# Patient Record
Sex: FEMALE | Race: WHITE | NOT HISPANIC OR LATINO | Employment: FULL TIME | URBAN - METROPOLITAN AREA
[De-identification: names, ages, dates, MRNs, and addresses within clinical notes are randomized per-mention and may not be internally consistent; named-entity substitution may affect disease eponyms.]

---

## 2023-03-13 LAB
ERYTHROCYTE DISTRIBUTION WIDTH (RATIO) BY AUTOMATED COUNT: 15.4 % (ref 11.5–14.5)
ERYTHROCYTE MEAN CORPUSCULAR HEMOGLOBIN CONCENTRATION (G/DL) BY AUTOMATED: 31.2 G/DL (ref 32–36)
ERYTHROCYTE MEAN CORPUSCULAR VOLUME (FL) BY AUTOMATED COUNT: 89 FL (ref 80–100)
ERYTHROCYTES (10*6/UL) IN BLOOD BY AUTOMATED COUNT: 3.8 X10E12/L (ref 4–5.2)
FERRITIN, PREGNANCY: 13 UG/L
HEMATOCRIT (%) IN BLOOD BY AUTOMATED COUNT: 34 % (ref 36–46)
HEMOGLOBIN (G/DL) IN BLOOD: 10.6 G/DL (ref 12–16)
IRON (UG/DL) IN SER/PLAS IN PREGNANCY: 50 UG/DL
IRON BINDING CAPACITY (UG/DL) IN PREGNANCY: >500 UG/DL
IRON SATURATION (%) IN PREGNANCY: ABNORMAL %
LEUKOCYTES (10*3/UL) IN BLOOD BY AUTOMATED COUNT: 8.6 X10E9/L (ref 4.4–11.3)
PLATELETS (10*3/UL) IN BLOOD AUTOMATED COUNT: 344 X10E9/L (ref 150–450)
REFLEX ADDED, ANEMIA PANEL: ABNORMAL

## 2023-03-14 LAB
FOLATE, SERUM, PREGNANCY: 7.2 NG/ML
VITAMIN B12, PREGNANCY: 237 PG/ML

## 2023-04-28 LAB
ERYTHROCYTE DISTRIBUTION WIDTH (RATIO) BY AUTOMATED COUNT: 15.5 % (ref 11.5–14.5)
ERYTHROCYTE MEAN CORPUSCULAR HEMOGLOBIN CONCENTRATION (G/DL) BY AUTOMATED: 30.1 G/DL (ref 32–36)
ERYTHROCYTE MEAN CORPUSCULAR VOLUME (FL) BY AUTOMATED COUNT: 88 FL (ref 80–100)
ERYTHROCYTES (10*6/UL) IN BLOOD BY AUTOMATED COUNT: 3.83 X10E12/L (ref 4–5.2)
HEMATOCRIT (%) IN BLOOD BY AUTOMATED COUNT: 33.6 % (ref 36–46)
HEMOGLOBIN (G/DL) IN BLOOD: 10.1 G/DL (ref 12–16)
LEUKOCYTES (10*3/UL) IN BLOOD BY AUTOMATED COUNT: 9.3 X10E9/L (ref 4.4–11.3)
PLATELETS (10*3/UL) IN BLOOD AUTOMATED COUNT: 373 X10E9/L (ref 150–450)
REFLEX ADDED, ANEMIA PANEL: ABNORMAL

## 2023-04-29 LAB
FERRITIN, PREGNANCY: 15 UG/L
FOLATE, SERUM, PREGNANCY: 6.7 NG/ML
IRON (UG/DL) IN SER/PLAS IN PREGNANCY: 51 UG/DL
IRON BINDING CAPACITY (UG/DL) IN PREGNANCY: >501 UG/DL
IRON SATURATION (%) IN PREGNANCY: ABNORMAL %
VITAMIN B12, PREGNANCY: 237 PG/ML

## 2023-05-01 LAB
CREATININE (MG/DL) IN URINE: 216 MG/DL (ref 20–320)
PROTEIN (MG/DL) IN URINE: 36 MG/DL (ref 5–24)
PROTEIN/CREATININE (MG/MG) IN URINE: 0.17 MG/MG CREAT (ref 0–0.17)

## 2023-05-04 LAB — GROUP B STREP SCREEN: NORMAL

## 2023-05-15 LAB
ALANINE AMINOTRANSFERASE (SGPT) (U/L) IN SER/PLAS: 10 U/L (ref 7–45)
ALBUMIN (G/DL) IN SER/PLAS: 3 G/DL (ref 3.4–5)
ALKALINE PHOSPHATASE (U/L) IN SER/PLAS: 161 U/L (ref 33–110)
ANION GAP IN SER/PLAS: 10 MMOL/L (ref 10–20)
ASPARTATE AMINOTRANSFERASE (SGOT) (U/L) IN SER/PLAS: 9 U/L (ref 9–39)
BILIRUBIN TOTAL (MG/DL) IN SER/PLAS: 0.7 MG/DL (ref 0–1.2)
CALCIUM (MG/DL) IN SER/PLAS: 8.5 MG/DL (ref 8.6–10.3)
CARBON DIOXIDE, TOTAL (MMOL/L) IN SER/PLAS: 23 MMOL/L (ref 21–32)
CHLORIDE (MMOL/L) IN SER/PLAS: 107 MMOL/L (ref 98–107)
CREATININE (MG/DL) IN SER/PLAS: 0.63 MG/DL (ref 0.5–1.05)
CREATININE (MG/DL) IN URINE: 161 MG/DL (ref 20–320)
ERYTHROCYTE DISTRIBUTION WIDTH (RATIO) BY AUTOMATED COUNT: 16 % (ref 11.5–14.5)
ERYTHROCYTE MEAN CORPUSCULAR HEMOGLOBIN CONCENTRATION (G/DL) BY AUTOMATED: 30.9 G/DL (ref 32–36)
ERYTHROCYTE MEAN CORPUSCULAR VOLUME (FL) BY AUTOMATED COUNT: 87 FL (ref 80–100)
ERYTHROCYTES (10*6/UL) IN BLOOD BY AUTOMATED COUNT: 4.02 X10E12/L (ref 4–5.2)
FERRITIN, PREGNANCY: 16 UG/L
FOLATE, SERUM, PREGNANCY: 10.5 NG/ML
GFR FEMALE: >90 ML/MIN/1.73M2
GLUCOSE (MG/DL) IN SER/PLAS: 80 MG/DL (ref 74–99)
HEMATOCRIT (%) IN BLOOD BY AUTOMATED COUNT: 35 % (ref 36–46)
HEMOGLOBIN (G/DL) IN BLOOD: 10.8 G/DL (ref 12–16)
IRON (UG/DL) IN SER/PLAS IN PREGNANCY: 63 UG/DL
IRON BINDING CAPACITY (UG/DL) IN PREGNANCY: >513 UG/DL
IRON SATURATION (%) IN PREGNANCY: ABNORMAL %
LEUKOCYTES (10*3/UL) IN BLOOD BY AUTOMATED COUNT: 8.6 X10E9/L (ref 4.4–11.3)
PLATELETS (10*3/UL) IN BLOOD AUTOMATED COUNT: 372 X10E9/L (ref 150–450)
POTASSIUM (MMOL/L) IN SER/PLAS: 4.1 MMOL/L (ref 3.5–5.3)
PROTEIN (MG/DL) IN URINE: 27 MG/DL (ref 5–24)
PROTEIN TOTAL: 6.8 G/DL (ref 6.4–8.2)
PROTEIN/CREATININE (MG/MG) IN URINE: 0.17 MG/MG CREAT (ref 0–0.17)
REFLEX ADDED, ANEMIA PANEL: ABNORMAL
SODIUM (MMOL/L) IN SER/PLAS: 136 MMOL/L (ref 136–145)
UREA NITROGEN (MG/DL) IN SER/PLAS: 10 MG/DL (ref 6–23)
VITAMIN B12, PREGNANCY: 209 PG/ML

## 2023-10-28 ENCOUNTER — HOSPITAL ENCOUNTER (EMERGENCY)
Facility: HOSPITAL | Age: 28
Discharge: HOME | End: 2023-10-28
Payer: COMMERCIAL

## 2023-10-28 VITALS
BODY MASS INDEX: 50.02 KG/M2 | RESPIRATION RATE: 18 BRPM | OXYGEN SATURATION: 98 % | HEIGHT: 64 IN | WEIGHT: 293 LBS | HEART RATE: 98 BPM | TEMPERATURE: 99 F | SYSTOLIC BLOOD PRESSURE: 140 MMHG | DIASTOLIC BLOOD PRESSURE: 88 MMHG

## 2023-10-28 DIAGNOSIS — J02.0 STREP PHARYNGITIS: Primary | ICD-10-CM

## 2023-10-28 PROBLEM — K21.9 CHRONIC GERD: Status: ACTIVE | Noted: 2023-10-28

## 2023-10-28 PROBLEM — R76.8 POSITIVE HEPATITIS C ANTIBODY TEST: Status: ACTIVE | Noted: 2023-10-28

## 2023-10-28 LAB
S PYO DNA THROAT QL NAA+PROBE: DETECTED
SARS-COV-2 RNA RESP QL NAA+PROBE: NOT DETECTED

## 2023-10-28 PROCEDURE — 87635 SARS-COV-2 COVID-19 AMP PRB: CPT | Performed by: PHYSICIAN ASSISTANT

## 2023-10-28 PROCEDURE — 99284 EMERGENCY DEPT VISIT MOD MDM: CPT

## 2023-10-28 PROCEDURE — 2500000001 HC RX 250 WO HCPCS SELF ADMINISTERED DRUGS (ALT 637 FOR MEDICARE OP): Performed by: PHYSICIAN ASSISTANT

## 2023-10-28 PROCEDURE — 87651 STREP A DNA AMP PROBE: CPT | Performed by: PHYSICIAN ASSISTANT

## 2023-10-28 PROCEDURE — 2500000004 HC RX 250 GENERAL PHARMACY W/ HCPCS (ALT 636 FOR OP/ED): Mod: JZ | Performed by: PHYSICIAN ASSISTANT

## 2023-10-28 RX ORDER — PREDNISONE 20 MG/1
40 TABLET ORAL ONCE
Status: COMPLETED | OUTPATIENT
Start: 2023-10-28 | End: 2023-10-28

## 2023-10-28 RX ORDER — ACETAMINOPHEN 160 MG/5ML
650 SOLUTION ORAL ONCE
Status: COMPLETED | OUTPATIENT
Start: 2023-10-28 | End: 2023-10-28

## 2023-10-28 RX ADMIN — PENICILLIN G BENZATHINE 1.2 MILLION UNITS: 1200000 INJECTION, SUSPENSION INTRAMUSCULAR at 23:42

## 2023-10-28 RX ADMIN — PREDNISONE 40 MG: 20 TABLET ORAL at 21:00

## 2023-10-28 RX ADMIN — ACETAMINOPHEN 650 MG: 325 SUSPENSION ORAL at 23:18

## 2023-10-28 ASSESSMENT — PAIN - FUNCTIONAL ASSESSMENT: PAIN_FUNCTIONAL_ASSESSMENT: 0-10

## 2023-10-28 ASSESSMENT — PAIN SCALES - GENERAL: PAINLEVEL_OUTOF10: 2

## 2023-10-28 ASSESSMENT — PAIN DESCRIPTION - FREQUENCY: FREQUENCY: CONSTANT/CONTINUOUS

## 2023-10-28 ASSESSMENT — PAIN DESCRIPTION - LOCATION: LOCATION: THROAT

## 2023-10-28 ASSESSMENT — PAIN DESCRIPTION - DESCRIPTORS: DESCRIPTORS: SORE

## 2023-10-28 NOTE — Clinical Note
Clementine Jay was seen and treated in our emergency department on 10/28/2023.  She may return to work on 10/30/2023.       If you have any questions or concerns, please don't hesitate to call.      Will Juarez PA-C

## 2023-10-29 NOTE — ED PROVIDER NOTES
HPI   Chief Complaint   Patient presents with    Sore Throat       History of present illness: 28-year-old female complains of sore throat since yesterday.  Says the pain is moderate, aching, persistent, nonradiating.  Patient has the ability to swallow although it is painful.  Patient has associated fevers chills congestion and cough.  Patient states that tonsils swelling is causing her to gag and she has had a few episodes of emesis.  Patient has no current nausea at this time.  Denies any denies abdominal pain, diarrhea, constipation, dysuria, polyuria, headache, neck stiffness.  She has taken over-the-counter medicine with limited improvement of symptoms.  Patient states she has a history of enlarged tonsils.    Review of systems: Constitutional, eye, ENT, cardiovascular, respiratory, gastrointestinal, genitourinary, neurologic, musculoskeletal, dermatologic, hematologic, endocrine systems were evaluated and were negative unless otherwise specified in history of present illness.    Medications: Reviewed and per nursing note.    Family history: Denies relevant medical conditions.    Past medical history: None per patient    Social history: Denies tobacco, alcohol, drug use.      Physical exam:    Appearance: Well-developed, well-nourished, nontoxic-appearing, alert and oriented x3. Talking in complete sentences.    HEENT: 3+ tonsillar edema with erythema and exudates.  Head normocephalic atraumatic, extraocular movements intact, pupils equal round reactive to light, mucous membranes are moist and pink, normal facial symmetry. Uvula is midline with no stridor, drooling, trismus. No induration the floor of the mouth.  Normal tympanic membranes.    NECK:  Nml Inspection, no meningismus, no thyromegaly, no lymphadenopathy, trachea is midline, no JVD.    Respiratory: Clear to auscultation bilaterally with normal bilateral excursion. No wheezes, rhonchi, rales.    Cardiovascular: Regular rate and rhythm, no murmurs  rubs or gallops. Pulses 2+ symmetrically in the dorsalis pedis and radial pulses.    Abdomen/GI:  Soft, nontender.    :  No CVA tenderness    Neuro:  Oriented x 3, Speech Clear, cranial nerves grossly intact.    Musculoskeletal: Patient spontaneously moves all 4 extremities.    Skin:  No rashes.                          Michaela Coma Scale Score: 15                  Patient History   Past Medical History:   Diagnosis Date    Nausea with vomiting, unspecified 12/13/2019    Nausea and vomiting in adult     Past Surgical History:   Procedure Laterality Date    OTHER SURGICAL HISTORY  11/04/2019    Wart removal     No family history on file.  Social History     Tobacco Use    Smoking status: Not on file    Smokeless tobacco: Not on file   Substance Use Topics    Alcohol use: Not on file    Drug use: Not on file       Physical Exam   ED Triage Vitals   Temp Heart Rate Resp BP   10/28/23 1833 10/28/23 1833 10/28/23 1833 10/28/23 1835   36.9 °C (98.4 °F) (!) 112 18 152/90      SpO2 Temp Source Heart Rate Source Patient Position   -- 10/28/23 1833 10/28/23 1833 10/28/23 1833    Temporal Monitor Sitting      BP Location FiO2 (%)     10/28/23 1833 --     Left arm        Physical Exam    ED Course & MDM   Diagnoses as of 10/28/23 2302   Strep pharyngitis       Medical Decision Making  Labs Reviewed  GROUP A STREPTOCOCCUS, PCR - Abnormal     Group A Strep PCR             Detected (*)            SARS-COV-2 PCR, SYMPTOMATIC - Normal      Patient complains of sore throat.  Differential diagnosis of viral pharyngitis, strep pharyngitis, peritonsillar abscess, mononucleosis.  Examination shows tonsillar edema erythema exudates.  Uvula is midline with no stridor drooling or trismus.  No evidence of abscess formation.  Currently afebrile and nontoxic-appearing.    Rapid strep test, COVID-19 test ordered.  Given prednisone for swollen tonsils.  COVID-19 negative and rapid strep positive.  Patient told results.  Given options of  treatment.  Patient given Bicillin LA 1.2 minutes and IM and Tylenol liquid.    Patient plans on taking Tylenol at home as needed for symptom control.  Will need to follow-up with primary provider.    Patient will be discharged to home.  Patient is educated in signs and symptoms of worsening symptoms and reasons to come back to the emergency department.  Will need to follow up with primary care provider.  Patient does not report social determinants of health impacting ability to obtain care that is needed.  Patient agrees with plan.    This is a transcription.  Text was reviewed for errors, but some transcription errors may remain.  Please call for any questions.              Procedure  Procedures     Will Juarez PA-C  10/28/23 6338

## 2024-01-28 PROBLEM — Z01.419 WELL WOMAN EXAM: Status: ACTIVE | Noted: 2024-01-28

## 2024-01-29 ENCOUNTER — OFFICE VISIT (OUTPATIENT)
Dept: OBSTETRICS AND GYNECOLOGY | Facility: CLINIC | Age: 29
End: 2024-01-29
Payer: COMMERCIAL

## 2024-01-29 VITALS
BODY MASS INDEX: 50.02 KG/M2 | SYSTOLIC BLOOD PRESSURE: 112 MMHG | HEIGHT: 64 IN | DIASTOLIC BLOOD PRESSURE: 72 MMHG | WEIGHT: 293 LBS

## 2024-01-29 DIAGNOSIS — N93.9 ABNORMAL UTERINE BLEEDING (AUB): Primary | ICD-10-CM

## 2024-01-29 PROCEDURE — 99213 OFFICE O/P EST LOW 20 MIN: CPT | Performed by: OBSTETRICS & GYNECOLOGY

## 2024-01-29 PROCEDURE — 1036F TOBACCO NON-USER: CPT | Performed by: OBSTETRICS & GYNECOLOGY

## 2024-01-29 RX ORDER — OMEPRAZOLE 20 MG/1
1 CAPSULE, DELAYED RELEASE ORAL EVERY 24 HOURS
COMMUNITY

## 2024-01-29 RX ORDER — ETONOGESTREL 68 MG/1
IMPLANT SUBCUTANEOUS
COMMUNITY
Start: 2023-06-08

## 2024-01-29 NOTE — PROGRESS NOTES
Subjective   Patient ID: Clementine Jay is a 28 y.o. female who presents for Vaginal Bleeding (Last period was 3 weeks long.).  Vaginal Bleeding      28-year-old with the complaint of abnormal bleeding for 3 weeks.  Patient stopped bleeding 1.5 weeks ago.  Previously had been doing well on the Nexplanon.        Objective   Physical Exam  Constitutional:       Appearance: Normal appearance.   Neurological:      Mental Status: She is alert.   Psychiatric:         Mood and Affect: Mood normal.         Behavior: Behavior normal.         Assessment/Plan   Problem List Items Addressed This Visit             ICD-10-CM    Abnormal uterine bleeding (AUB) - Primary N93.9     --AUB: Patient with the Nexplanon inserted June 2023.  Patient had monthly menses lasting 5 to 7 days on the Nexplanon and tolerating well until December when she bled for 3 weeks, with heavy bleeding for 1.5 weeks.  Bleeding stopped 1.5 weeks ago.  Discussed with patient possible AUB on the Nexplanon.  Her UCG is negative.  Discussed option of observation, addition of hormonal control, or removal of the Nexplanon.  This was her first episode of AUB on the Nexplanon, will observe for now.  If recurs she will notify me.  --CONTRACEPTION:  patient with the Nexplanon inserted June 2023.  Patient with AUB as discussed above.  Will observe for now.  --normal PAP in 2021 and June 2023  --Patient in P1

## 2024-01-29 NOTE — ASSESSMENT & PLAN NOTE
--AUB: Patient with the Nexplanon inserted June 2023.  Patient had monthly menses lasting 5 to 7 days on the Nexplanon and tolerating well until December when she bled for 3 weeks, with heavy bleeding for 1.5 weeks.  Bleeding stopped 1.5 weeks ago.  Discussed with patient possible AUB on the Nexplanon.  Her UCG is negative.  Discussed option of observation, addition of hormonal control, or removal of the Nexplanon.  This was her first episode of AUB on the Nexplanon, will observe for now.  If recurs she will notify me.  --CONTRACEPTION:  patient with the Nexplanon inserted June 2023.  Patient with AUB as discussed above.  Will observe for now.  --normal PAP in 2021 and June 2023  --Patient in P1

## 2024-05-30 ENCOUNTER — TELEPHONE (OUTPATIENT)
Dept: PRIMARY CARE | Facility: CLINIC | Age: 29
End: 2024-05-30
Payer: COMMERCIAL

## 2024-05-30 DIAGNOSIS — J01.90 ACUTE SINUSITIS, RECURRENCE NOT SPECIFIED, UNSPECIFIED LOCATION: Primary | ICD-10-CM

## 2024-05-30 RX ORDER — AMOXICILLIN AND CLAVULANATE POTASSIUM 875; 125 MG/1; MG/1
875 TABLET, FILM COATED ORAL 2 TIMES DAILY
Qty: 20 TABLET | Refills: 0 | Status: SHIPPED | OUTPATIENT
Start: 2024-05-30 | End: 2024-06-09

## 2024-05-30 NOTE — TELEPHONE ENCOUNTER
How long have you been sick? Since Sunday  Cough (productive or nonproductive)? Yes  Color of phlegm? Yellow   Sinus pain?Yes  Sinus drainage/color? Yellow  Earache? Yes  Congestion? Yes  Headache? Yes  Fever (if yes, temp)? Yes   Sore throat? Yes   Short of breath/wheezing? Slight wheezing   OTC medication? Cough syrup and Tylenol    Patient has also had her vision go blurry due to coughing  Patient tested negative for Covid.     LOV: N/A  NOV: N/A

## 2024-06-14 ENCOUNTER — OFFICE VISIT (OUTPATIENT)
Dept: PRIMARY CARE | Facility: CLINIC | Age: 29
End: 2024-06-14
Payer: COMMERCIAL

## 2024-06-14 VITALS
HEART RATE: 74 BPM | BODY MASS INDEX: 50.02 KG/M2 | WEIGHT: 293 LBS | SYSTOLIC BLOOD PRESSURE: 136 MMHG | RESPIRATION RATE: 18 BRPM | TEMPERATURE: 96.8 F | HEIGHT: 64 IN | DIASTOLIC BLOOD PRESSURE: 84 MMHG | OXYGEN SATURATION: 99 %

## 2024-06-14 DIAGNOSIS — R05.1 ACUTE COUGH: ICD-10-CM

## 2024-06-14 DIAGNOSIS — Z00.00 PHYSICAL EXAM, ANNUAL: Primary | ICD-10-CM

## 2024-06-14 PROBLEM — O36.8190 DECREASED FETAL MOVEMENT (HHS-HCC): Status: ACTIVE | Noted: 2023-05-05

## 2024-06-14 PROBLEM — M25.561 RIGHT KNEE PAIN: Status: ACTIVE | Noted: 2024-06-14

## 2024-06-14 PROBLEM — M00.9 INFECTION OF RIGHT KNEE (MULTI): Status: ACTIVE | Noted: 2024-06-14

## 2024-06-14 PROBLEM — L30.9 DERMATITIS: Status: ACTIVE | Noted: 2024-06-14

## 2024-06-14 PROBLEM — R06.83 SNORING: Status: ACTIVE | Noted: 2024-06-14

## 2024-06-14 PROBLEM — A04.8 POSITIVE H. PYLORI TEST: Status: ACTIVE | Noted: 2024-06-14

## 2024-06-14 PROBLEM — O16.9 ELEVATED BLOOD PRESSURE AFFECTING PREGNANCY, ANTEPARTUM (HHS-HCC): Status: ACTIVE | Noted: 2024-06-14

## 2024-06-14 PROBLEM — E86.0 DEHYDRATION: Status: ACTIVE | Noted: 2024-06-14

## 2024-06-14 PROBLEM — R03.0 ELEVATED BLOOD PRESSURE READING: Status: ACTIVE | Noted: 2023-04-02

## 2024-06-14 PROBLEM — B34.9 ACUTE VIRAL SYNDROME: Status: ACTIVE | Noted: 2024-06-14

## 2024-06-14 PROBLEM — H66.91 ACUTE OTITIS MEDIA, RIGHT: Status: ACTIVE | Noted: 2024-06-14

## 2024-06-14 PROBLEM — N92.1 BREAKTHROUGH BLEEDING ON NEXPLANON: Status: ACTIVE | Noted: 2024-06-14

## 2024-06-14 PROBLEM — O99.810 ABNORMAL GLUCOSE AFFECTING PREGNANCY (HHS-HCC): Status: ACTIVE | Noted: 2024-06-14

## 2024-06-14 PROBLEM — O99.210 OBESITY DURING PREGNANCY (HHS-HCC): Status: ACTIVE | Noted: 2024-06-14

## 2024-06-14 PROBLEM — G93.32 CHRONIC FATIGUE DISORDER: Status: ACTIVE | Noted: 2024-06-14

## 2024-06-14 PROBLEM — J01.00 ACUTE MAXILLARY SINUSITIS: Status: ACTIVE | Noted: 2024-06-14

## 2024-06-14 PROBLEM — O99.019 ANEMIA OF PREGNANCY (HHS-HCC): Status: ACTIVE | Noted: 2024-06-14

## 2024-06-14 PROBLEM — R51.9 HEADACHE: Status: ACTIVE | Noted: 2024-06-14

## 2024-06-14 PROBLEM — B07.0 PLANTAR WART OF RIGHT FOOT: Status: ACTIVE | Noted: 2024-06-14

## 2024-06-14 PROBLEM — M25.571 RIGHT ANKLE PAIN: Status: ACTIVE | Noted: 2024-06-14

## 2024-06-14 PROBLEM — L03.119 CELLULITIS OF KNEE: Status: ACTIVE | Noted: 2024-06-14

## 2024-06-14 PROBLEM — Z97.5 BREAKTHROUGH BLEEDING ON NEXPLANON: Status: ACTIVE | Noted: 2024-06-14

## 2024-06-14 PROBLEM — R79.83 HYPERHOMOCYSTINEMIA: Status: ACTIVE | Noted: 2024-06-14

## 2024-06-14 PROBLEM — O99.213 OBESITY AFFECTING PREGNANCY IN THIRD TRIMESTER (HHS-HCC): Status: ACTIVE | Noted: 2024-06-14

## 2024-06-14 PROBLEM — R00.2 PALPITATIONS: Status: ACTIVE | Noted: 2024-06-14

## 2024-06-14 PROBLEM — R06.02 SOB (SHORTNESS OF BREATH): Status: ACTIVE | Noted: 2024-06-14

## 2024-06-14 PROBLEM — O21.9 NAUSEA AND VOMITING DURING PREGNANCY (HHS-HCC): Status: ACTIVE | Noted: 2024-06-14

## 2024-06-14 PROBLEM — B27.90: Status: ACTIVE | Noted: 2024-06-14

## 2024-06-14 PROBLEM — J84.89 INTERSTITIAL PNEUMONITIS (MULTI): Status: ACTIVE | Noted: 2024-06-14

## 2024-06-14 PROBLEM — B27.10: Status: ACTIVE | Noted: 2024-06-14

## 2024-06-14 PROBLEM — L03.90 RECURRENT CELLULITIS: Status: ACTIVE | Noted: 2024-06-14

## 2024-06-14 PROBLEM — N89.8 VAGINAL DISCHARGE: Status: ACTIVE | Noted: 2024-06-14

## 2024-06-14 PROBLEM — E53.8 FOLATE DEFICIENCY: Status: ACTIVE | Noted: 2024-06-14

## 2024-06-14 PROBLEM — J35.1 ENLARGED TONSILS: Status: ACTIVE | Noted: 2024-06-14

## 2024-06-14 PROBLEM — N90.89 LABIAL LESION: Status: ACTIVE | Noted: 2024-06-14

## 2024-06-14 PROBLEM — N39.0 UTI (URINARY TRACT INFECTION): Status: ACTIVE | Noted: 2024-06-14

## 2024-06-14 PROBLEM — M54.50 LOW BACK PAIN: Status: ACTIVE | Noted: 2023-04-01

## 2024-06-14 PROBLEM — N92.6 IRREGULAR MENSES: Status: ACTIVE | Noted: 2024-06-14

## 2024-06-14 PROBLEM — R35.0 URINARY FREQUENCY: Status: ACTIVE | Noted: 2024-06-14

## 2024-06-14 PROBLEM — R06.2 WHEEZING ON AUSCULTATION: Status: ACTIVE | Noted: 2024-06-14

## 2024-06-14 PROCEDURE — 99213 OFFICE O/P EST LOW 20 MIN: CPT

## 2024-06-14 PROCEDURE — 1036F TOBACCO NON-USER: CPT

## 2024-06-14 SDOH — ECONOMIC STABILITY: FOOD INSECURITY: WITHIN THE PAST 12 MONTHS, YOU WORRIED THAT YOUR FOOD WOULD RUN OUT BEFORE YOU GOT MONEY TO BUY MORE.: NEVER TRUE

## 2024-06-14 SDOH — ECONOMIC STABILITY: FOOD INSECURITY: WITHIN THE PAST 12 MONTHS, THE FOOD YOU BOUGHT JUST DIDN'T LAST AND YOU DIDN'T HAVE MONEY TO GET MORE.: NEVER TRUE

## 2024-06-14 ASSESSMENT — ANXIETY QUESTIONNAIRES
1. FEELING NERVOUS, ANXIOUS, OR ON EDGE: NOT AT ALL
3. WORRYING TOO MUCH ABOUT DIFFERENT THINGS: NOT AT ALL
6. BECOMING EASILY ANNOYED OR IRRITABLE: NOT AT ALL
GAD7 TOTAL SCORE: 0
7. FEELING AFRAID AS IF SOMETHING AWFUL MIGHT HAPPEN: NOT AT ALL
2. NOT BEING ABLE TO STOP OR CONTROL WORRYING: NOT AT ALL
4. TROUBLE RELAXING: NOT AT ALL
5. BEING SO RESTLESS THAT IT IS HARD TO SIT STILL: NOT AT ALL
IF YOU CHECKED OFF ANY PROBLEMS ON THIS QUESTIONNAIRE, HOW DIFFICULT HAVE THESE PROBLEMS MADE IT FOR YOU TO DO YOUR WORK, TAKE CARE OF THINGS AT HOME, OR GET ALONG WITH OTHER PEOPLE: NOT DIFFICULT AT ALL

## 2024-06-14 ASSESSMENT — ENCOUNTER SYMPTOMS
NEUROLOGICAL NEGATIVE: 1
ALLERGIC/IMMUNOLOGIC NEGATIVE: 1
GASTROINTESTINAL NEGATIVE: 1
CONSTITUTIONAL NEGATIVE: 1
ENDOCRINE NEGATIVE: 1
DEPRESSION: 0
EYES NEGATIVE: 1
COUGH: 1
PSYCHIATRIC NEGATIVE: 1
HEMATOLOGIC/LYMPHATIC NEGATIVE: 1
OCCASIONAL FEELINGS OF UNSTEADINESS: 0
LOSS OF SENSATION IN FEET: 0
MUSCULOSKELETAL NEGATIVE: 1
CARDIOVASCULAR NEGATIVE: 1

## 2024-06-14 ASSESSMENT — LIFESTYLE VARIABLES
SKIP TO QUESTIONS 9-10: 1
HOW OFTEN DO YOU HAVE SIX OR MORE DRINKS ON ONE OCCASION: NEVER
AUDIT-C TOTAL SCORE: 0
HOW MANY STANDARD DRINKS CONTAINING ALCOHOL DO YOU HAVE ON A TYPICAL DAY: PATIENT DOES NOT DRINK
HOW OFTEN DO YOU HAVE A DRINK CONTAINING ALCOHOL: NEVER

## 2024-06-14 ASSESSMENT — PAIN SCALES - GENERAL: PAINLEVEL: 0-NO PAIN

## 2024-06-14 ASSESSMENT — PATIENT HEALTH QUESTIONNAIRE - PHQ9
SUM OF ALL RESPONSES TO PHQ9 QUESTIONS 1 & 2: 0
2. FEELING DOWN, DEPRESSED OR HOPELESS: NOT AT ALL
1. LITTLE INTEREST OR PLEASURE IN DOING THINGS: NOT AT ALL

## 2024-06-14 NOTE — PROGRESS NOTES
"Subjective   Patient ID: Clementine Jay is a 29 y.o. female who presents for Annual Exam (Forms need completed ).    HPI   Clementine presents for a physical exam and paperwork for new employment to be filled out. She has a lingering cough from her recent illness, but otherwise has no health concerns. She declines routine lab work at this time.     Review of Systems   Constitutional: Negative.    HENT: Negative.     Eyes: Negative.    Respiratory:  Positive for cough.    Cardiovascular: Negative.    Gastrointestinal: Negative.    Endocrine: Negative.    Genitourinary: Negative.    Musculoskeletal: Negative.    Skin: Negative.    Allergic/Immunologic: Negative.    Neurological: Negative.    Hematological: Negative.    Psychiatric/Behavioral: Negative.         Objective   /84 (BP Location: Left arm, Patient Position: Sitting, BP Cuff Size: Adult long)   Pulse 74   Temp 36 °C (96.8 °F) (Temporal)   Resp 18   Ht 1.626 m (5' 4\")   Wt 141 kg (310 lb)   SpO2 99%   BMI 53.21 kg/m²     Physical Exam  Constitutional:       Appearance: Normal appearance.   HENT:      Head: Normocephalic and atraumatic.      Right Ear: Tympanic membrane normal.      Left Ear: Tympanic membrane normal.   Cardiovascular:      Rate and Rhythm: Normal rate and regular rhythm.      Pulses: Normal pulses.      Heart sounds: Normal heart sounds.   Pulmonary:      Effort: Pulmonary effort is normal.      Breath sounds: Normal breath sounds.   Musculoskeletal:         General: Normal range of motion.      Cervical back: Normal range of motion and neck supple.   Skin:     General: Skin is warm and dry.      Capillary Refill: Capillary refill takes less than 2 seconds.   Neurological:      Mental Status: She is oriented to person, place, and time.   Psychiatric:         Mood and Affect: Mood normal.         Behavior: Behavior normal.         Thought Content: Thought content normal.         Judgment: Judgment normal.         Assessment/Plan "   Problem List Items Addressed This Visit             ICD-10-CM    Physical exam, annual - Primary Z00.00     Declined routine labs at this time.          Relevant Orders    T-Spot TB    Acute cough R05.1     Recommend mucinex-DM over the counter and a daily antihistamine, such as Claritin or Zyrtec.

## 2024-06-24 ENCOUNTER — APPOINTMENT (OUTPATIENT)
Dept: OBSTETRICS AND GYNECOLOGY | Facility: CLINIC | Age: 29
End: 2024-06-24
Payer: COMMERCIAL

## 2024-07-09 ENCOUNTER — HOSPITAL ENCOUNTER (EMERGENCY)
Facility: HOSPITAL | Age: 29
Discharge: HOME | End: 2024-07-09

## 2024-07-09 VITALS
HEIGHT: 64 IN | HEART RATE: 68 BPM | TEMPERATURE: 97.7 F | OXYGEN SATURATION: 97 % | RESPIRATION RATE: 18 BRPM | SYSTOLIC BLOOD PRESSURE: 134 MMHG | DIASTOLIC BLOOD PRESSURE: 80 MMHG | BODY MASS INDEX: 50.02 KG/M2 | WEIGHT: 293 LBS

## 2024-07-09 DIAGNOSIS — J06.9 ACUTE URI: ICD-10-CM

## 2024-07-09 DIAGNOSIS — H66.002 NON-RECURRENT ACUTE SUPPURATIVE OTITIS MEDIA OF LEFT EAR WITHOUT SPONTANEOUS RUPTURE OF TYMPANIC MEMBRANE: Primary | ICD-10-CM

## 2024-07-09 DIAGNOSIS — J03.90 TONSILLITIS: ICD-10-CM

## 2024-07-09 LAB
FLUAV RNA RESP QL NAA+PROBE: NOT DETECTED
FLUBV RNA RESP QL NAA+PROBE: NOT DETECTED
RSV RNA RESP QL NAA+PROBE: NOT DETECTED
S PYO DNA THROAT QL NAA+PROBE: NOT DETECTED
SARS-COV-2 RNA RESP QL NAA+PROBE: NOT DETECTED

## 2024-07-09 PROCEDURE — 87651 STREP A DNA AMP PROBE: CPT | Performed by: NURSE PRACTITIONER

## 2024-07-09 PROCEDURE — 87637 SARSCOV2&INF A&B&RSV AMP PRB: CPT | Performed by: NURSE PRACTITIONER

## 2024-07-09 PROCEDURE — 2500000001 HC RX 250 WO HCPCS SELF ADMINISTERED DRUGS (ALT 637 FOR MEDICARE OP): Performed by: NURSE PRACTITIONER

## 2024-07-09 PROCEDURE — 99283 EMERGENCY DEPT VISIT LOW MDM: CPT

## 2024-07-09 RX ORDER — AMOXICILLIN 500 MG/1
500 CAPSULE ORAL ONCE
Status: COMPLETED | OUTPATIENT
Start: 2024-07-09 | End: 2024-07-09

## 2024-07-09 RX ORDER — AMOXICILLIN 875 MG/1
875 TABLET, FILM COATED ORAL 2 TIMES DAILY
Qty: 20 TABLET | Refills: 0 | Status: SHIPPED | OUTPATIENT
Start: 2024-07-09 | End: 2024-07-19

## 2024-07-09 RX ORDER — BROMPHENIRAMINE MALEATE, PSEUDOEPHEDRINE HYDROCHLORIDE, AND DEXTROMETHORPHAN HYDROBROMIDE 2; 30; 10 MG/5ML; MG/5ML; MG/5ML
10 SYRUP ORAL 4 TIMES DAILY PRN
Qty: 300 ML | Refills: 0 | Status: SHIPPED | OUTPATIENT
Start: 2024-07-09 | End: 2024-07-14

## 2024-07-09 ASSESSMENT — PAIN DESCRIPTION - LOCATION
LOCATION: EAR
LOCATION_2: THROAT

## 2024-07-09 ASSESSMENT — LIFESTYLE VARIABLES
HAVE PEOPLE ANNOYED YOU BY CRITICIZING YOUR DRINKING: NO
TOTAL SCORE: 0
HAVE YOU EVER FELT YOU SHOULD CUT DOWN ON YOUR DRINKING: NO
EVER FELT BAD OR GUILTY ABOUT YOUR DRINKING: NO
EVER HAD A DRINK FIRST THING IN THE MORNING TO STEADY YOUR NERVES TO GET RID OF A HANGOVER: NO

## 2024-07-09 ASSESSMENT — COLUMBIA-SUICIDE SEVERITY RATING SCALE - C-SSRS
1. IN THE PAST MONTH, HAVE YOU WISHED YOU WERE DEAD OR WISHED YOU COULD GO TO SLEEP AND NOT WAKE UP?: NO
6. HAVE YOU EVER DONE ANYTHING, STARTED TO DO ANYTHING, OR PREPARED TO DO ANYTHING TO END YOUR LIFE?: NO
2. HAVE YOU ACTUALLY HAD ANY THOUGHTS OF KILLING YOURSELF?: NO

## 2024-07-09 ASSESSMENT — VISUAL ACUITY: OU: 1

## 2024-07-09 ASSESSMENT — PAIN - FUNCTIONAL ASSESSMENT
PAIN_FUNCTIONAL_ASSESSMENT: 0-10
PAIN_FUNCTIONAL_ASSESSMENT: 0-10

## 2024-07-09 ASSESSMENT — PAIN DESCRIPTION - DESCRIPTORS: DESCRIPTORS_2: ACHING

## 2024-07-09 ASSESSMENT — PAIN SCALES - GENERAL
PAINLEVEL_OUTOF10: 4
PAINLEVEL_OUTOF10: 4
PAINLEVEL_OUTOF10: 3

## 2024-07-09 ASSESSMENT — PAIN DESCRIPTION - PAIN TYPE: TYPE: ACUTE PAIN

## 2024-07-09 ASSESSMENT — PAIN DESCRIPTION - ORIENTATION: ORIENTATION: LEFT;RIGHT

## 2024-07-09 NOTE — ED PROVIDER NOTES
"    HPI   Chief Complaint   Patient presents with    Earache     Bilateral ear pain    Sore Throat       Patient presents to the emergency department for evaluation of URI symptoms for the last 6 days.  She works at a  and there is multiple illness in her .  Her biggest concern is her bilateral ears, left worse than the right.  She has a history of ear infections without tympanostomy tubes as well as strep throat at the beginning of the year.  Her symptoms have been for the last 6 days with earache, sore throat, subjective fever, chills, myalgias, malaise and productive cough.  She has not experienced any syncope nor has she measured her temperature, head chest pain, shortness of breath, hemoptysis, vomiting, diarrhea, abdominal pain, UTI symptoms, leg swelling or rash.  She is taken over-the-counter Tylenol, ibuprofen and cold relief with minimal improvement in her symptoms.  She was sent home from work on Tuesday and Wednesday and needs to have testing to return to work.  Patient has concern because she does not have insurance.  She has no prior history of CAD, pulmonary disease or blood clots.      History provided by:  Patient   used: No                          Hamlin Coma Scale Score: 15                  Patient History   Past Medical History:   Diagnosis Date    Nausea with vomiting, unspecified 12/13/2019    Nausea and vomiting in adult     Past Surgical History:   Procedure Laterality Date    OTHER SURGICAL HISTORY  11/04/2019    Wart removal     No family history on file.  Social History     Tobacco Use    Smoking status: Never    Smokeless tobacco: Never   Vaping Use    Vaping status: Never Used   Substance Use Topics    Alcohol use: Not Currently    Drug use: Never       Physical Exam   Visit Vitals  /80 (Patient Position: Sitting)   Pulse 68   Temp 36.5 °C (97.7 °F) (Oral)   Resp 18   Ht 1.626 m (5' 4\")   Wt 136 kg (300 lb)   LMP 06/13/2024 (Approximate)   SpO2 " 97%   BMI 51.49 kg/m²   OB Status Having periods   Smoking Status Never   BSA 2.48 m²      Physical Exam  Vitals reviewed.   Constitutional:       General: She is not in acute distress.     Appearance: She is not ill-appearing.   HENT:      Head: Normocephalic and atraumatic. No right periorbital erythema or left periorbital erythema.      Right Ear: Hearing, ear canal and external ear normal. A middle ear effusion (white fluid with blood center) is present. No mastoid tenderness. Tympanic membrane is not perforated.      Left Ear: Hearing, ear canal and external ear normal. A middle ear effusion (yellow fluid with swelling) is present. No mastoid tenderness. Tympanic membrane is bulging. Tympanic membrane is not perforated.      Nose: Congestion and rhinorrhea present. Rhinorrhea is clear.      Mouth/Throat:      Lips: Pink.      Mouth: Mucous membranes are moist.      Pharynx: Oropharynx is clear. Uvula midline. Posterior oropharyngeal erythema present. No uvula swelling.      Tonsils: No tonsillar exudate or tonsillar abscesses. 4+ on the right. 4+ on the left.      Comments: No pooled secretions in the posterior oropharynx.  Normal phonation.  No stridor.  Eyes:      General: Lids are normal. Vision grossly intact. No allergic shiner.  Neck:      Trachea: Trachea and phonation normal.   Cardiovascular:      Rate and Rhythm: Normal rate and regular rhythm.      Pulses:           Radial pulses are 2+ on the left side.      Heart sounds: No murmur heard.  Pulmonary:      Effort: Pulmonary effort is normal.      Breath sounds: Normal breath sounds. No wheezing or rhonchi.   Abdominal:      Palpations: Abdomen is soft.      Tenderness: There is no abdominal tenderness.   Musculoskeletal:      Cervical back: Full passive range of motion without pain and neck supple.      Right lower leg: No edema.      Left lower leg: No edema.      Comments: SAGE porras.   Lymphadenopathy:      Cervical: Cervical adenopathy present.       Right cervical: Superficial cervical adenopathy present. No posterior cervical adenopathy.     Left cervical: Superficial cervical adenopathy present. No posterior cervical adenopathy.   Skin:     General: Skin is warm and dry.      Capillary Refill: Capillary refill takes less than 2 seconds.      Findings: No rash.   Neurological:      General: No focal deficit present.      Mental Status: She is alert and oriented to person, place, and time.   Psychiatric:         Mood and Affect: Mood and affect normal.         Behavior: Behavior is cooperative.         No orders to display       Labs Reviewed   GROUP A STREPTOCOCCUS, PCR - Normal       Result Value    Group A Strep PCR Not Detected     RSV PCR - Normal    RSV PCR Not Detected      Narrative:     This assay is an FDA-cleared, in vitro diagnostic nucleic acid amplification test for the detection of RSV from nasopharyngeal specimens, and has been validated for use at [facility]. Negative results do not preclude RSV infections, and should not be used as the sole basis for diagnosis, treatment, or other management decisions. If Influenza A/B and RSV PCR results are negative, testing for Parainfluenza virus, Adenovirus and Metapneumovirus is routinely performed for pediatric oncology and intensive care inpatients at [facility], and is available on other patients by placing an add-on request.       INFLUENZA A AND B PCR - Normal    Flu A Result Not Detected      Flu B Result Not Detected      Narrative:     This assay is an in vitro diagnostic multiplex nucleic acid amplification test for the detection and discrimination of Influenza A & B from nasopharyngeal specimens, and has been validated for use at [facility]. Negative results do not preclude Influenza A/B infections, and should not be used as the sole basis for diagnosis, treatment, or other management decisions. If Influenza A/B and RSV PCR results are negative,  testing for Parainfluenza virus, Adenovirus and Metapneumovirus is routinely performed for [facility] pediatric oncology and intensive care inpatients, and is available on other patients by placing an add-on request.   SARS-COV-2 PCR - Normal    Coronavirus 2019, PCR Not Detected      Narrative:     This assay has received FDA Emergency Use Authorization (EUA) and is only authorized for the duration of time that circumstances exist to justify the authorization of the emergency use of in vitro diagnostic tests for the detection of SARS-CoV-2 virus and/or diagnosis of COVID-19 infection under section 564(b)(1) of the Act, 21 U.S.C. 360bbb-3(b)(1). This assay is an in vitro diagnostic nucleic acid amplification test for the qualitative detection of SARS-CoV-2 from nasopharyngeal specimens and has been validated for use at [facility]. Negative results do not preclude COVID-19 infections and should not be used as the sole basis for diagnosis, treatment, or other management decisions.         ED Course & MDM     Medical Decision Making  Patient presents the emergency department for evaluation of cold symptoms.  Differential diagnosis of otitis media, strep pharyngitis, COVID-19 to mention a few.  Discussed patient's options given her lack of insurance.  She is amenable to strep testing and viral testing given her work at a .  We do not have suspicion of pneumonia based on her clinical exam therefore shared decision making for no chest x-ray.      Based on the history and physical examination findings, there is clinical evidence of a bacterial infection. Therefore, antibiotics are considered appropriate at this time. Patient is well appearing, non toxic and appropriate for outpatient management as listed below:    Normal progression of disease discussed.  All questions answered.  Instruction provided in the use of fluids, vaporizer, acetaminophen, and other OTC medication for symptom control.  Extra  fluids  Analgesics as needed, dose reviewed.  Follow-up in 1 week, or sooner should symptoms worsen.    Patient verbalizes understanding of instructions, is amenable to this outpatient management plan and departed in stable condition with no verbalized social determinants of health that would obscure this plan.         ED Course as of 07/09/24 2319 Tue Jul 09, 2024 2129 Plan is for amoxicillin 875 mg BID for 7 days, Bromfed, increased oral fluids and rest. Patient is non-toxic, not hypoxic and appropriate for this outpatient management plan which they prefer. Encouragement to arrange close follow up was discussed as well as provided in a written handout of discharge instructions. Patient was educated on signs of symptoms to watch for indicative of re-evaluation in the emergency department setting to include any worsening of current symptoms. They verbalized understanding of instructions and is amenable to this treatment plan with no social determinants of health that would obscure this plan. Patient departed in stable condition.  [NA]      ED Course User Index  [NA] Cary Feldman, APRN-CNP         Diagnoses as of 07/09/24 2319   Non-recurrent acute suppurative otitis media of left ear without spontaneous rupture of tympanic membrane   Tonsillitis   Acute URI          Your medication list        START taking these medications        Instructions Last Dose Given Next Dose Due   amoxicillin 875 mg tablet  Commonly known as: Amoxil      Take 1 tablet (875 mg) by mouth 2 times a day for 10 days.       brompheniramine-pseudoeph-DM 2-30-10 mg/5 mL syrup      Take 10 mL by mouth 4 times a day as needed for cough or congestion for up to 5 days.              ASK your doctor about these medications        Instructions Last Dose Given Next Dose Due   Nexplanon 68 mg implant implant  Generic drug: etonogestrel-eluting contraceptive           omeprazole 20 mg DR capsule  Commonly known as: PriLOSEC                     Where  to Get Your Medications        These medications were sent to [id number] - [place] - [address]  [address], [place] [address]      Phone: [phone]   amoxicillin 875 mg tablet  brompheniramine-pseudoeph-DM 2-30-10 mg/5 mL syrup         Procedure  None     *This report was transcribed using voice recognition software.  Every effort was made to ensure accuracy; however, inadvertent computerized transcription errors may be present.*  SILVIANO Otto-RENO  07/09/24         SILVIANO Otto-RENO  07/09/24 [id number]

## 2024-07-09 NOTE — Clinical Note
Clementine Jay was seen and treated in our emergency department on 7/9/2024.  She may return to work on 07/11/2024.       If you have any questions or concerns, please don't hesitate to call.      Cary Feldman, APRN-CNP

## 2024-12-01 ENCOUNTER — APPOINTMENT (OUTPATIENT)
Dept: RADIOLOGY | Facility: HOSPITAL | Age: 29
End: 2024-12-01
Payer: MEDICAID

## 2024-12-01 ENCOUNTER — HOSPITAL ENCOUNTER (EMERGENCY)
Facility: HOSPITAL | Age: 29
Discharge: HOME | End: 2024-12-01
Payer: MEDICAID

## 2024-12-01 VITALS
WEIGHT: 293 LBS | TEMPERATURE: 97.9 F | RESPIRATION RATE: 18 BRPM | SYSTOLIC BLOOD PRESSURE: 119 MMHG | DIASTOLIC BLOOD PRESSURE: 79 MMHG | BODY MASS INDEX: 50.02 KG/M2 | OXYGEN SATURATION: 96 % | HEIGHT: 64 IN | HEART RATE: 81 BPM

## 2024-12-01 DIAGNOSIS — M25.532 PAIN IN BOTH WRISTS: ICD-10-CM
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
## 2024-12-01 DIAGNOSIS — M25.531 PAIN IN BOTH WRISTS: ICD-10-CM

## 2024-12-01 LAB — HCG UR QL IA.RAPID: NEGATIVE

## 2024-12-01 PROCEDURE — 73090 X-RAY EXAM OF FOREARM: CPT | Mod: LT

## 2024-12-01 PROCEDURE — 73130 X-RAY EXAM OF HAND: CPT | Mod: BILATERAL PROCEDURE | Performed by: RADIOLOGY

## 2024-12-01 PROCEDURE — 2500000004 HC RX 250 GENERAL PHARMACY W/ HCPCS (ALT 636 FOR OP/ED): Performed by: NURSE PRACTITIONER

## 2024-12-01 PROCEDURE — 81025 URINE PREGNANCY TEST: CPT | Performed by: NURSE PRACTITIONER

## 2024-12-01 PROCEDURE — 96372 THER/PROPH/DIAG INJ SC/IM: CPT | Performed by: NURSE PRACTITIONER

## 2024-12-01 PROCEDURE — 73130 X-RAY EXAM OF HAND: CPT | Mod: LT

## 2024-12-01 PROCEDURE — 73110 X-RAY EXAM OF WRIST: CPT | Mod: BILATERAL PROCEDURE | Performed by: RADIOLOGY

## 2024-12-01 PROCEDURE — 73110 X-RAY EXAM OF WRIST: CPT | Mod: 50

## 2024-12-01 PROCEDURE — 73090 X-RAY EXAM OF FOREARM: CPT | Mod: BILATERAL PROCEDURE | Performed by: RADIOLOGY

## 2024-12-01 PROCEDURE — 99284 EMERGENCY DEPT VISIT MOD MDM: CPT

## 2024-12-01 RX ORDER — NAPROXEN 500 MG/1
500 TABLET ORAL 2 TIMES DAILY PRN
Qty: 14 TABLET | Refills: 0 | Status: SHIPPED | OUTPATIENT
Start: 2024-12-01 | End: 2024-12-08

## 2024-12-01 RX ORDER — KETOROLAC TROMETHAMINE 30 MG/ML
30 INJECTION, SOLUTION INTRAMUSCULAR; INTRAVENOUS ONCE
Status: COMPLETED | OUTPATIENT
Start: 2024-12-01 | End: 2024-12-01

## 2024-12-01 ASSESSMENT — LIFESTYLE VARIABLES
EVER HAD A DRINK FIRST THING IN THE MORNING TO STEADY YOUR NERVES TO GET RID OF A HANGOVER: NO
EVER FELT BAD OR GUILTY ABOUT YOUR DRINKING: NO
TOTAL SCORE: 0
HAVE YOU EVER FELT YOU SHOULD CUT DOWN ON YOUR DRINKING: NO
HAVE PEOPLE ANNOYED YOU BY CRITICIZING YOUR DRINKING: NO

## 2024-12-01 ASSESSMENT — PAIN - FUNCTIONAL ASSESSMENT: PAIN_FUNCTIONAL_ASSESSMENT: 0-10

## 2024-12-01 ASSESSMENT — COLUMBIA-SUICIDE SEVERITY RATING SCALE - C-SSRS
2. HAVE YOU ACTUALLY HAD ANY THOUGHTS OF KILLING YOURSELF?: NO
6. HAVE YOU EVER DONE ANYTHING, STARTED TO DO ANYTHING, OR PREPARED TO DO ANYTHING TO END YOUR LIFE?: NO
1. IN THE PAST MONTH, HAVE YOU WISHED YOU WERE DEAD OR WISHED YOU COULD GO TO SLEEP AND NOT WAKE UP?: NO

## 2024-12-01 ASSESSMENT — PAIN DESCRIPTION - LOCATION: LOCATION: WRIST

## 2024-12-01 ASSESSMENT — PAIN DESCRIPTION - ORIENTATION: ORIENTATION: LEFT

## 2024-12-01 ASSESSMENT — PAIN DESCRIPTION - PAIN TYPE: TYPE: ACUTE PAIN

## 2024-12-01 ASSESSMENT — PAIN SCALES - GENERAL: PAINLEVEL_OUTOF10: 10 - WORST POSSIBLE PAIN

## 2024-12-01 ASSESSMENT — PAIN DESCRIPTION - DESCRIPTORS: DESCRIPTORS: SHOOTING;THROBBING

## 2024-12-01 NOTE — Clinical Note
Clementine Jay was seen and treated in our emergency department on 12/1/2024.  She may return to work on 12/02/2024.       If you have any questions or concerns, please don't hesitate to call.      Leticai Aguirre, APRN-CNP

## 2024-12-01 NOTE — ED PROVIDER NOTES
HPI   Chief Complaint   Patient presents with    Wrist Injury    Fall       -year-old female with no significant past medical history presents emergency department today for evaluation after a fall.  Patient states that she fell mechanically on Friday falling down steps.  She has been having bilateral wrist and left hand/forearm pain since that time.  She did not hit her head or lose consciousness.  Patient is on any blood thinners.  Did take Tylenol last night with no significant improvement in her discomfort.  Not take anything just prior to arrival for the pain.  Denies any chance of pregnancy states that she has a Nexplanon and recently had his menstrual cycle.  No further pain or injury.                          Michaela Coma Scale Score: 15                  Patient History   Past Medical History:   Diagnosis Date    Nausea with vomiting, unspecified 12/13/2019    Nausea and vomiting in adult     Past Surgical History:   Procedure Laterality Date    OTHER SURGICAL HISTORY  11/04/2019    Wart removal     No family history on file.  Social History     Tobacco Use    Smoking status: Never    Smokeless tobacco: Never   Vaping Use    Vaping status: Never Used   Substance Use Topics    Alcohol use: Not Currently    Drug use: Never       Physical Exam   ED Triage Vitals [12/01/24 1448]   Temperature Heart Rate Respirations BP   36.6 °C (97.9 °F) 81 18 119/79      Pulse Ox Temp src Heart Rate Source Patient Position   96 % -- -- --      BP Location FiO2 (%)     -- --       Physical Exam  Vitals reviewed.   Constitutional:       Appearance: Normal appearance.   HENT:      Head: Normocephalic.   Cardiovascular:      Rate and Rhythm: Normal rate and regular rhythm.   Pulmonary:      Effort: Pulmonary effort is normal.      Breath sounds: Normal breath sounds.   Abdominal:      General: Abdomen is flat.      Palpations: Abdomen is soft.   Musculoskeletal:      Right shoulder: Normal.      Left shoulder: Normal.      Right  elbow: Normal.      Left elbow: Normal.      Left forearm: Tenderness and bony tenderness present. No swelling or deformity.      Right wrist: Tenderness and bony tenderness present. No swelling or snuff box tenderness. Normal range of motion. Normal pulse.      Left wrist: Tenderness and bony tenderness present. No swelling or snuff box tenderness. Normal range of motion. Normal pulse.      Right hand: Normal.      Left hand: Swelling and bony tenderness present. Normal range of motion. Normal capillary refill. Normal pulse.   Skin:     General: Skin is warm and dry.      Capillary Refill: Capillary refill takes less than 2 seconds.   Neurological:      Mental Status: She is alert.         Labs Reviewed   HCG, URINE, QUALITATIVE - Normal       Result Value    HCG, Urine NEGATIVE       Pain Management Panel          Latest Ref Rng & Units 11/8/2019   Pain Management Panel   Amphetamine Screen, Urine NEGATIVE PRESUMPTIVE NEGATIVE    Barbiturate Screen, Urine NEGATIVE PRESUMPTIVE NEGATIVE    Alcohol, Urine <20 mg/dL <10    Methadone Screen, Urine NEGATIVE PRESUMPTIVE NEGATIVE       Details                 XR wrist 3+ views bilateral   Final Result   No evident acute fracture or dislocation.             MACRO:   None        Signed by: Baron Collins 12/1/2024 3:58 PM   Dictation workstation:   [id number]      XR hand left 3+ views   Final Result   No evident acute fracture or dislocation.             MACRO:   None        Signed by: Baron Collins 12/1/2024 3:58 PM   Dictation workstation:   [id number]      XR forearm left 2 views   Final Result   No evident acute fracture or dislocation.             MACRO:   None        Signed by: Baron Collins 12/1/2024 3:58 PM   Dictation workstation:   [id number]          ED Course & MDM   Diagnoses as of 12/01/24 1602   Fall, initial encounter   Pain in both wrists       Medical Decision Making  On initial evaluation patient is well-appearing emergency department at this  time.  She was given Toradol 30 mg IM in the ED.  Improvement in her pain on reevaluation.  X-ray of the wrist, hand and forearm ordered.  There is no evident acute fracture or dislocation in any imaging.  Luis discussed results in depth with patient.  Educated on rest ice compression elevation.  Discussed return precautions and outpatient follow-up.  Patient verbalized understanding agreement plan has no further questions or concerns patient will be discharged home in stable condition.        Procedure  Procedures    I discussed the differential, results and discharge plan with the patient and/or family/friend/caregiver if present.  I emphasized the importance of follow-up with the physician I referred them to in the timeframe recommended.  I explained reasons for the patient to return to the Emergency Department. Additional verbal discharge instructions were also given and discussed with the patient to supplement those generated by the EMR. We also discussed medications that were prescribed (if any) including common side effects and interactions. The patient was advised to abstain from driving, operating heavy machinery or making significant decisions while taking medications such as opiates and muscle relaxers that may impair this. All questions were addressed.  They understand return precautions and discharge instructions. The patient and/or family/friend/caregiver expressed understanding.           Leticia Aguirre, SILVIANO-RENO  12/01/24 2501

## 2024-12-01 NOTE — ED TRIAGE NOTES
Pt. Arrived to the ED via private car for c/o wrist injury and fall. Pt. States that on Friday she fell down 4 steps on her back porch Pt. Stated that she was doing okay but today woke up her her left wrist was in extreme pain. Pt. States her right wrist hurts as well but not as much. Pt. Denies any LOC, or hitting her head

## 2025-06-08 ENCOUNTER — HOSPITAL ENCOUNTER (EMERGENCY)
Facility: HOSPITAL | Age: 30
Discharge: HOME | End: 2025-06-08
Payer: MEDICAID

## 2025-06-08 ENCOUNTER — APPOINTMENT (OUTPATIENT)
Dept: CARDIOLOGY | Facility: HOSPITAL | Age: 30
End: 2025-06-08
Payer: MEDICAID

## 2025-06-08 ENCOUNTER — APPOINTMENT (OUTPATIENT)
Dept: RADIOLOGY | Facility: HOSPITAL | Age: 30
End: 2025-06-08
Payer: MEDICAID

## 2025-06-08 VITALS
TEMPERATURE: 97.6 F | RESPIRATION RATE: 18 BRPM | OXYGEN SATURATION: 98 % | DIASTOLIC BLOOD PRESSURE: 70 MMHG | BODY MASS INDEX: 50.02 KG/M2 | WEIGHT: 293 LBS | SYSTOLIC BLOOD PRESSURE: 108 MMHG | HEART RATE: 63 BPM | HEIGHT: 64 IN

## 2025-06-08 DIAGNOSIS — M79.89 LEG SWELLING: Primary | ICD-10-CM

## 2025-06-08 DIAGNOSIS — R60.0 FLUID RETENTION IN LEGS: ICD-10-CM

## 2025-06-08 LAB
ALBUMIN SERPL BCP-MCNC: 3.5 G/DL (ref 3.4–5)
ALP SERPL-CCNC: 54 U/L (ref 33–110)
ALT SERPL W P-5'-P-CCNC: 13 U/L (ref 7–45)
ANION GAP SERPL CALC-SCNC: 12 MMOL/L (ref 10–20)
APPEARANCE UR: CLEAR
AST SERPL W P-5'-P-CCNC: 11 U/L (ref 9–39)
BASOPHILS # BLD AUTO: 0.04 X10*3/UL (ref 0–0.1)
BASOPHILS NFR BLD AUTO: 0.5 %
BILIRUB SERPL-MCNC: 0.5 MG/DL (ref 0–1.2)
BILIRUB UR STRIP.AUTO-MCNC: NEGATIVE MG/DL
BNP SERPL-MCNC: 38 PG/ML (ref 0–99)
BUN SERPL-MCNC: 11 MG/DL (ref 6–23)
CALCIUM SERPL-MCNC: 8.3 MG/DL (ref 8.6–10.3)
CARDIAC TROPONIN I PNL SERPL HS: 3 NG/L (ref 0–13)
CARDIAC TROPONIN I PNL SERPL HS: <3 NG/L (ref 0–13)
CHLORIDE SERPL-SCNC: 106 MMOL/L (ref 98–107)
CO2 SERPL-SCNC: 25 MMOL/L (ref 21–32)
COLOR UR: ABNORMAL
CREAT SERPL-MCNC: 0.82 MG/DL (ref 0.5–1.05)
D DIMER PPP FEU-MCNC: 416 NG/ML FEU
EGFRCR SERPLBLD CKD-EPI 2021: >90 ML/MIN/1.73M*2
EOSINOPHIL # BLD AUTO: 0.24 X10*3/UL (ref 0–0.7)
EOSINOPHIL NFR BLD AUTO: 2.9 %
ERYTHROCYTE [DISTWIDTH] IN BLOOD BY AUTOMATED COUNT: 16.3 % (ref 11.5–14.5)
GLUCOSE SERPL-MCNC: 98 MG/DL (ref 74–99)
GLUCOSE UR STRIP.AUTO-MCNC: NORMAL MG/DL
HCG UR QL IA.RAPID: NEGATIVE
HCT VFR BLD AUTO: 38.2 % (ref 36–46)
HGB BLD-MCNC: 11.7 G/DL (ref 12–16)
IMM GRANULOCYTES # BLD AUTO: 0.04 X10*3/UL (ref 0–0.7)
IMM GRANULOCYTES NFR BLD AUTO: 0.5 % (ref 0–0.9)
KETONES UR STRIP.AUTO-MCNC: NEGATIVE MG/DL
LEUKOCYTE ESTERASE UR QL STRIP.AUTO: ABNORMAL
LYMPHOCYTES # BLD AUTO: 2.52 X10*3/UL (ref 1.2–4.8)
LYMPHOCYTES NFR BLD AUTO: 30.2 %
MCH RBC QN AUTO: 26.4 PG (ref 26–34)
MCHC RBC AUTO-ENTMCNC: 30.6 G/DL (ref 32–36)
MCV RBC AUTO: 86 FL (ref 80–100)
MONOCYTES # BLD AUTO: 0.47 X10*3/UL (ref 0.1–1)
MONOCYTES NFR BLD AUTO: 5.6 %
MUCOUS THREADS #/AREA URNS AUTO: NORMAL /LPF
NEUTROPHILS # BLD AUTO: 5.03 X10*3/UL (ref 1.2–7.7)
NEUTROPHILS NFR BLD AUTO: 60.3 %
NITRITE UR QL STRIP.AUTO: NEGATIVE
NRBC BLD-RTO: 0 /100 WBCS (ref 0–0)
PH UR STRIP.AUTO: 6 [PH]
PLATELET # BLD AUTO: 383 X10*3/UL (ref 150–450)
POTASSIUM SERPL-SCNC: 4 MMOL/L (ref 3.5–5.3)
PROT SERPL-MCNC: 6.4 G/DL (ref 6.4–8.2)
PROT UR STRIP.AUTO-MCNC: NEGATIVE MG/DL
RBC # BLD AUTO: 4.44 X10*6/UL (ref 4–5.2)
RBC # UR STRIP.AUTO: NEGATIVE MG/DL
RBC #/AREA URNS AUTO: NORMAL /HPF
SODIUM SERPL-SCNC: 139 MMOL/L (ref 136–145)
SP GR UR STRIP.AUTO: 1.02
SQUAMOUS #/AREA URNS AUTO: NORMAL /HPF
UROBILINOGEN UR STRIP.AUTO-MCNC: NORMAL MG/DL
WBC # BLD AUTO: 8.3 X10*3/UL (ref 4.4–11.3)
WBC #/AREA URNS AUTO: NORMAL /HPF

## 2025-06-08 PROCEDURE — 73610 X-RAY EXAM OF ANKLE: CPT | Mod: RT

## 2025-06-08 PROCEDURE — 80053 COMPREHEN METABOLIC PANEL: CPT | Performed by: NURSE PRACTITIONER

## 2025-06-08 PROCEDURE — 93005 ELECTROCARDIOGRAM TRACING: CPT

## 2025-06-08 PROCEDURE — 73630 X-RAY EXAM OF FOOT: CPT | Mod: RIGHT SIDE | Performed by: RADIOLOGY

## 2025-06-08 PROCEDURE — 84484 ASSAY OF TROPONIN QUANT: CPT | Performed by: NURSE PRACTITIONER

## 2025-06-08 PROCEDURE — 36415 COLL VENOUS BLD VENIPUNCTURE: CPT | Performed by: NURSE PRACTITIONER

## 2025-06-08 PROCEDURE — 81025 URINE PREGNANCY TEST: CPT | Performed by: NURSE PRACTITIONER

## 2025-06-08 PROCEDURE — 99285 EMERGENCY DEPT VISIT HI MDM: CPT | Mod: 25

## 2025-06-08 PROCEDURE — 85379 FIBRIN DEGRADATION QUANT: CPT | Performed by: NURSE PRACTITIONER

## 2025-06-08 PROCEDURE — 81001 URINALYSIS AUTO W/SCOPE: CPT | Performed by: NURSE PRACTITIONER

## 2025-06-08 PROCEDURE — 2500000001 HC RX 250 WO HCPCS SELF ADMINISTERED DRUGS (ALT 637 FOR MEDICARE OP): Performed by: NURSE PRACTITIONER

## 2025-06-08 PROCEDURE — 73630 X-RAY EXAM OF FOOT: CPT | Mod: RT

## 2025-06-08 PROCEDURE — 71046 X-RAY EXAM CHEST 2 VIEWS: CPT

## 2025-06-08 PROCEDURE — 83880 ASSAY OF NATRIURETIC PEPTIDE: CPT | Performed by: NURSE PRACTITIONER

## 2025-06-08 PROCEDURE — 87086 URINE CULTURE/COLONY COUNT: CPT | Mod: PORLAB | Performed by: NURSE PRACTITIONER

## 2025-06-08 PROCEDURE — 71046 X-RAY EXAM CHEST 2 VIEWS: CPT | Performed by: RADIOLOGY

## 2025-06-08 PROCEDURE — 73610 X-RAY EXAM OF ANKLE: CPT | Mod: RIGHT SIDE | Performed by: RADIOLOGY

## 2025-06-08 PROCEDURE — 85025 COMPLETE CBC W/AUTO DIFF WBC: CPT | Performed by: NURSE PRACTITIONER

## 2025-06-08 RX ORDER — ACETAMINOPHEN 325 MG/1
975 TABLET ORAL ONCE
Status: COMPLETED | OUTPATIENT
Start: 2025-06-08 | End: 2025-06-08

## 2025-06-08 RX ADMIN — ACETAMINOPHEN 975 MG: 325 TABLET ORAL at 16:37

## 2025-06-08 ASSESSMENT — LIFESTYLE VARIABLES
TOTAL SCORE: 0
HAVE YOU EVER FELT YOU SHOULD CUT DOWN ON YOUR DRINKING: NO
HAVE PEOPLE ANNOYED YOU BY CRITICIZING YOUR DRINKING: NO
EVER HAD A DRINK FIRST THING IN THE MORNING TO STEADY YOUR NERVES TO GET RID OF A HANGOVER: NO
EVER FELT BAD OR GUILTY ABOUT YOUR DRINKING: NO

## 2025-06-08 ASSESSMENT — PAIN SCALES - GENERAL
PAINLEVEL_OUTOF10: 0 - NO PAIN
PAINLEVEL_OUTOF10: 4

## 2025-06-08 ASSESSMENT — VISUAL ACUITY: OU: 1

## 2025-06-08 ASSESSMENT — PAIN DESCRIPTION - PAIN TYPE: TYPE: ACUTE PAIN

## 2025-06-08 ASSESSMENT — PAIN DESCRIPTION - LOCATION: LOCATION: LEG

## 2025-06-08 ASSESSMENT — PAIN DESCRIPTION - ORIENTATION: ORIENTATION: RIGHT;LEFT

## 2025-06-08 ASSESSMENT — PAIN DESCRIPTION - DIRECTION: RADIATING_TOWARDS: TOES

## 2025-06-08 ASSESSMENT — PAIN - FUNCTIONAL ASSESSMENT: PAIN_FUNCTIONAL_ASSESSMENT: 0-10

## 2025-06-08 ASSESSMENT — PAIN DESCRIPTION - FREQUENCY: FREQUENCY: INTERMITTENT

## 2025-06-08 ASSESSMENT — PAIN DESCRIPTION - DESCRIPTORS: DESCRIPTORS: PRESSURE

## 2025-06-08 NOTE — Clinical Note
Clementine Jay was seen and treated in our emergency department on 6/8/2025.  She may return to work on 06/11/2025.       If you have any questions or concerns, please don't hesitate to call.      Cary Feldman, APRN-CNP

## 2025-06-08 NOTE — ED PROVIDER NOTES
HPI   Chief Complaint   Patient presents with    Leg Swelling     Pt reports that she has had bilateral leg swelling that began on Friday. Pt reports that she has intermittent pain that occurs when she stands but when she sits it is not bad. Pt reports a pressure feeling and numbness to her lower leg when she stands.       Presents the emergency department for evaluation of bilateral leg swelling since Friday.  She denies having a history of this in the past.  She is on Nexplanon for birth control.  She has no prior cardiac history that she is aware of and there is family history of increased risk for blood clots which is the mother's primary concern.  Patient has no known trauma however she has been having pain at the right lateral ankle with ambulation that radiates to the lateral aspect of the foot about every 10 steps.  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  There has been no fever, chills, myalgias, malaise, syncope, chest pain, shortness of breath, hemoptysis, nausea, abdominal pain or UTI symptoms.  She denies any pain to the posterior aspect of the legs or history of PE or DVT.  She did try to elevate her legs and she did notice some improvement to the left leg initially.  However her swelling has been progressive to the bilateral legs and not responding to elevation now.  She denies any extensive ambulation, recent travel, change in diet, going to an event where she would have had change in her diet other than a birthday party however symptoms were prior to that.        History provided by:  Patient   used: No                          Sallis Coma Scale Score: 15                  Patient History   Medical History[1]  Surgical History[2]  Family History[3]  Social History[4]    Physical Exam   Visit Vitals  /70 (BP Location: Left arm, Patient Position: Sitting)   Pulse 63   Temp 36.4 °C (97.6 °F)   Resp 18   Ht 1.626  "m (5' 4\")   Wt 136 kg (300 lb)   SpO2 98%   BMI 51.49 kg/m²   OB Status Having periods   Smoking Status Never   BSA 2.48 m²      Physical Exam  Vitals reviewed.   Constitutional:       General: She is not in acute distress.     Appearance: She is obese. She is not toxic-appearing.   HENT:      Head: Normocephalic and atraumatic.      Right Ear: Hearing and external ear normal.      Left Ear: Hearing and external ear normal.      Nose: Nose normal.      Mouth/Throat:      Lips: Pink.      Mouth: Mucous membranes are moist.      Pharynx: Oropharynx is clear. Uvula midline.   Eyes:      General: Vision grossly intact. No scleral icterus.  Neck:      Vascular: No JVD.   Cardiovascular:      Rate and Rhythm: Normal rate and regular rhythm.      Pulses:           Dorsalis pedis pulses are 2+ on the right side and 2+ on the left side.      Heart sounds: No murmur heard.     Comments: No resting tachycardia.  There is 2+ pitting edema of the bilateral feet.  Edema seems to be localized more so to the lower aspect of the legs and feet.  The legs do appear symmetrical.  No outward sign of trauma.  No palpable cords of the posterior aspect of the legs.  Pulmonary:      Effort: Pulmonary effort is normal.      Breath sounds: Normal breath sounds. No wheezing or rhonchi.      Comments: No conversational dyspnea.  Musculoskeletal:      Cervical back: Normal range of motion and neck supple.      Right lower le+ Pitting Edema present.      Left lower le+ Pitting Edema present.      Comments: Patient has good range of motion of the bilateral lower extremities.  She does have pain upon palpation of the right lateral malleolus and lateral aspect right foot.  Patient has negative anterior drawer of the right ankle.  Strong pedal pulses bilaterally.  Neurovascularly intact.  The legs appear symmetrical.  No open wounds.  No erythema, warmth or ecchymosis.  Compartments all soft.   Skin:     General: Skin is warm and dry.      " Capillary Refill: Capillary refill takes less than 2 seconds.      Coloration: Skin is not cyanotic or pale.      Findings: No wound.   Neurological:      Mental Status: She is alert and oriented to person, place, and time.      Sensory: Sensation is intact.      Motor: Motor function is intact.      Coordination: Coordination is intact.      Gait: Gait is intact.   Psychiatric:         Behavior: Behavior is cooperative.         XR chest 2 views   Final Result   No acute cardiopulmonary process.        MACRO:   None        Signed by: Gayathri Vilchis 6/8/2025 5:24 PM   Dictation workstation:   [id number]      XR foot right 3+ views   Final Result   No acute fracture. Soft tissue swelling noted.             MACRO:   None        Signed by: Gayathri Vilchis 6/8/2025 5:23 PM   Dictation workstation:   [id number]      XR ankle right 3+ views   Final Result   No acute fracture. Soft tissue swelling noted.             MACRO:   None        Signed by: Gayathri Vilchis 6/8/2025 5:23 PM   Dictation workstation:   [id number]          Labs Reviewed   CBC WITH AUTO DIFFERENTIAL - Abnormal       Result Value    WBC 8.3      nRBC 0.0      RBC 4.44      Hemoglobin 11.7 (*)     Hematocrit 38.2      MCV 86      MCH 26.4      MCHC 30.6 (*)     RDW 16.3 (*)     Platelets 383      Neutrophils % 60.3      Immature Granulocytes %, Automated 0.5      Lymphocytes % 30.2      Monocytes % 5.6      Eosinophils % 2.9      Basophils % 0.5      Neutrophils Absolute 5.03      Immature Granulocytes Absolute, Automated 0.04      Lymphocytes Absolute 2.52      Monocytes Absolute 0.47      Eosinophils Absolute 0.24      Basophils Absolute 0.04     COMPREHENSIVE METABOLIC PANEL - Abnormal    Glucose 98      Sodium 139      Potassium 4.0      Chloride 106      Bicarbonate 25      Anion Gap 12      Urea Nitrogen 11      Creatinine 0.82      eGFR >90      Calcium 8.3 (*)     Albumin 3.5      Alkaline Phosphatase 54      Total Protein 6.4      AST 11       Bilirubin, Total 0.5      ALT 13     URINALYSIS WITH REFLEX CULTURE AND MICROSCOPIC - Abnormal    Color, Urine Light-Yellow      Appearance, Urine Clear      Specific Gravity, Urine 1.022      pH, Urine 6.0      Protein, Urine NEGATIVE      Glucose, Urine Normal      Blood, Urine NEGATIVE      Ketones, Urine NEGATIVE      Bilirubin, Urine NEGATIVE      Urobilinogen, Urine Normal      Nitrite, Urine NEGATIVE      Leukocyte Esterase, Urine 25 Shun/uL (*)    B-TYPE NATRIURETIC PEPTIDE - Normal    BNP 38      Narrative:        <100 pg/mL - Heart failure unlikely  100-299 pg/mL - Intermediate probability of acute heart                  failure exacerbation. Correlate with clinical                  context and patient history.    >=300 pg/mL - Heart Failure likely. Correlate with clinical                  context and patient history.    BNP testing is performed using different testing methodology at [facility] than at other [facility]. Direct result comparisons should only be made within the same method.      HCG, URINE, QUALITATIVE - Normal    HCG, Urine NEGATIVE     D-DIMER, VTE EXCLUSION - Normal    D-Dimer, Quantitative VTE Exclusion 416      Narrative:     The VTE Exclusion D-Dimer assay is reported in ng/mL Fibrinogen Equivalent Units (FEU).    Per 's instructions for use, a value of less than 500 ng/mL (FEU) may help to exclude DVT or PE in outpatients when the assay is used with a clinical pretest probability assessment.(AE must utilize and document eCalc 'Wells Score Deep Vein Thrombosis Risk' for DVT exclusion only. Emergency Department should utilize  Guidelines for Emergency Department Use of the VTE Exclusion D-Dimer and Clinical Pretest probability assessment model for DVT or PE exclusion.)   SERIAL TROPONIN-INITIAL - Normal    Troponin I, High Sensitivity <3      Narrative:     Less than 99th percentile of normal range cutoff-  Female and children under 18 years old <14  ng/L; Male <21 ng/L: Negative  Repeat testing should be performed if clinically indicated.     Female and children under 18 years old 14-50 ng/L; Male 21-50 ng/L:  Consistent with possible cardiac damage and possible increased clinical   risk. Serial measurements may help to assess extent of myocardial damage.     >50 ng/L: Consistent with cardiac damage, increased clinical risk and  myocardial infarction. Serial measurements may help assess extent of   myocardial damage.      NOTE: Children less than 1 year old may have higher baseline troponin   levels and results should be interpreted in conjunction with the overall   clinical context.     NOTE: Troponin I testing is performed using a different   testing methodology at [facility] than at other   [facility]. Direct result comparisons should only   be made within the same method.   SERIAL TROPONIN, 1 HOUR - Normal    Troponin I, High Sensitivity 3      Narrative:     Less than 99th percentile of normal range cutoff-  Female and children under 18 years old <14 ng/L; Male <21 ng/L: Negative  Repeat testing should be performed if clinically indicated.     Female and children under 18 years old 14-50 ng/L; Male 21-50 ng/L:  Consistent with possible cardiac damage and possible increased clinical   risk. Serial measurements may help to assess extent of myocardial damage.     >50 ng/L: Consistent with cardiac damage, increased clinical risk and  myocardial infarction. Serial measurements may help assess extent of   myocardial damage.      NOTE: Children less than 1 year old may have higher baseline troponin   levels and results should be interpreted in conjunction with the overall   clinical context.     NOTE: Troponin I testing is performed using a different   testing methodology at [facility] than at other   [facility]. Direct result comparisons should only   be made within the same method.   URINE CULTURE   TROPONIN SERIES-  (INITIAL, 1 HR)    Narrative:     The following orders were created for panel order Troponin I Series, High Sensitivity (0, 1 HR).  Procedure                               Abnormality         Status                     ---------                               -----------         ------                     Troponin I, High Sensiti...[[id number]]  Normal              Final result               Troponin, High Sensitivi...[[id number]]  Normal              Final result                 Please view results for these tests on the individual orders.   MICROSCOPIC ONLY, URINE    WBC, Urine 1-5      RBC, Urine NONE      Squamous Epithelial Cells, Urine 1-9 (SPARSE)      Mucus, Urine FEW     URINALYSIS WITH REFLEX CULTURE AND MICROSCOPIC    Narrative:     The following orders were created for panel order Urinalysis with Reflex Culture and Microscopic.  Procedure                               Abnormality         Status                     ---------                               -----------         ------                     Urinalysis with Reflex C...[[id number]]  Abnormal            Final result               Extra Urine Gray Tube[[id number]]                            In process                   Please view results for these tests on the individual orders.   EXTRA URINE GRAY TUBE       No results found for this or any previous visit (from the past 4464 hours).    ED Course & MDM     Medical Decision Making  Patient presents emergency department for evaluation of leg swelling.  Differential diagnosis of dependent edema, lymphedema, DVT, new onset right-sided heart failure to mention a few.  The latter is less likely however  the patient and her mother's concern is for possible blood clot as there is family history of blood clots and the patient is on Nexplanon so it is reasonable to do a D-dimer along with BNP, chest x-ray, EKG, and low suspicion for DVT given the symmetry of the legs, bilateral leg swelling and no posterior leg  tenderness.  If DVT is positive, will ultrasound.  She does have pain of the lateral ankle therefore we will x-ray of the right ankle and foot.  Will give a dose of Tylenol.  Patient provides consent for this plan.        ED Course as of 06/08/25 1905   Sun Jun 08, 2025   1642 HEMOGLOBIN(!): 11.7  Hemoglobin 10.0 two years ago. [NA]   1643 EKG is interpreted by physician negative for STEMI.  As interpreted myself shows sinus rhythm, heart rate 65 bpm, QTc 399 ms. [NA]   1643 HCG, Urine: NEGATIVE [NA]   1737 Troponin I, High Sensitivity: <3 [NA]   1737 BNP: 38 [NA]   1737 D-Dimer, Quantitative VTE Exclusion: 416 [NA]   1737 IMPRESSION:  No acute cardiopulmonary process.   [NA]   1738 FINDINGS:  AP, lateral and oblique views of the foot and ankle are obtained.      No acute fracture or dislocation. Bones are well mineralized. The ankle mortise is preserved. Posterior and plantar calcaneal spurring  noted. Diffuse soft tissue swelling noted. Incidental note is made of bipartite medial sesamoid.      IMPRESSION:  No acute fracture. Soft tissue swelling noted.   [NA]   1738 D-Dimer, Quantitative VTE Exclusion: 416  No US needed for DVT and low clinical suspicion as well.  [NA]   1903 Patient given her lab results.  There is no indication of requiring further diagnostics at this time as there is noted sign of congestive heart failure, suggested of blood clot or RHONDA.  Plan is for follow-up with primary care provider, elevation and DASH diet.  Patient will obtain over-the-counter compression stockings and given written information on how to apply them. There is currently no cellulitis or open wound.  Patient is non-toxic, not hypoxic and appropriate for this outpatient management plan which they prefer. Encouragement to arrange close follow up was discussed as well as provided in a written handout of discharge instructions. Patient was educated on signs of symptoms to watch for indicative of re-evaluation in the emergency  department setting to include any worsening of current symptoms. They verbalized understanding of instructions and is amenable to this treatment plan with no social determinants of health that would obscure this plan. Patient departed in stable condition.  [NA]      ED Course User Index  [NA] ALYSE Otto         Diagnoses as of 06/08/25 1905   Leg swelling   Fluid retention in legs          Your medication list        ASK your doctor about these medications        Instructions Last Dose Given Next Dose Due   Nexplanon 68 mg implant implant  Generic drug: etonogestrel-eluting contraceptive           omeprazole 20 mg DR capsule  Commonly known as: PriLOSEC                    Procedure  None     *This report was transcribed using voice recognition software.  Every effort was made to ensure accuracy; however, inadvertent computerized transcription errors may be present.*  ALYSE Otto  06/08/25           [1]   Past Medical History:  Diagnosis Date    Nausea with vomiting, unspecified 12/13/2019    Nausea and vomiting in adult   [2]   Past Surgical History:  Procedure Laterality Date    OTHER SURGICAL HISTORY  11/04/2019    Wart removal   [3] No family history on file.  [4]   Social History  Tobacco Use    Smoking status: Never    Smokeless tobacco: Never   Vaping Use    Vaping status: Never Used   Substance Use Topics    Alcohol use: Not Currently    Drug use: Never        ALYSE Otto  06/08/25 1905

## 2025-06-09 LAB
ATRIAL RATE: 65 BPM
HOLD SPECIMEN: NORMAL
P AXIS: -27 DEGREES
P OFFSET: 189 MS
P ONSET: 143 MS
PR INTERVAL: 152 MS
Q ONSET: 219 MS
QRS COUNT: 11 BEATS
QRS DURATION: 88 MS
QT INTERVAL: 384 MS
QTC CALCULATION(BAZETT): 399 MS
QTC FREDERICIA: 394 MS
R AXIS: 20 DEGREES
T AXIS: 1 DEGREES
T OFFSET: 411 MS
VENTRICULAR RATE: 65 BPM

## 2025-06-10 LAB — BACTERIA UR CULT: NORMAL

## 2025-06-25 ENCOUNTER — APPOINTMENT (OUTPATIENT)
Dept: PRIMARY CARE | Facility: CLINIC | Age: 30
End: 2025-06-25
Payer: COMMERCIAL

## 2025-06-25 VITALS
OXYGEN SATURATION: 96 % | TEMPERATURE: 97.3 F | HEART RATE: 66 BPM | WEIGHT: 293 LBS | SYSTOLIC BLOOD PRESSURE: 130 MMHG | RESPIRATION RATE: 18 BRPM | HEIGHT: 64 IN | DIASTOLIC BLOOD PRESSURE: 82 MMHG | BODY MASS INDEX: 50.02 KG/M2

## 2025-06-25 DIAGNOSIS — Z13.29 THYROID DISORDER SCREENING: ICD-10-CM

## 2025-06-25 DIAGNOSIS — Z13.220 ENCOUNTER FOR SCREENING FOR LIPID DISORDER: ICD-10-CM

## 2025-06-25 DIAGNOSIS — R60.0 PERIPHERAL EDEMA: Primary | ICD-10-CM

## 2025-06-25 DIAGNOSIS — R06.83 SNORING: ICD-10-CM

## 2025-06-25 PROBLEM — J84.89 INTERSTITIAL PNEUMONITIS: Status: RESOLVED | Noted: 2024-06-14 | Resolved: 2025-06-25

## 2025-06-25 PROCEDURE — 99214 OFFICE O/P EST MOD 30 MIN: CPT | Performed by: INTERNAL MEDICINE

## 2025-06-25 PROCEDURE — 1036F TOBACCO NON-USER: CPT | Performed by: INTERNAL MEDICINE

## 2025-06-25 PROCEDURE — 3075F SYST BP GE 130 - 139MM HG: CPT | Performed by: INTERNAL MEDICINE

## 2025-06-25 PROCEDURE — 3008F BODY MASS INDEX DOCD: CPT | Performed by: INTERNAL MEDICINE

## 2025-06-25 PROCEDURE — 3079F DIAST BP 80-89 MM HG: CPT | Performed by: INTERNAL MEDICINE

## 2025-06-25 SDOH — ECONOMIC STABILITY: FOOD INSECURITY: WITHIN THE PAST 12 MONTHS, THE FOOD YOU BOUGHT JUST DIDN'T LAST AND YOU DIDN'T HAVE MONEY TO GET MORE.: NEVER TRUE

## 2025-06-25 SDOH — ECONOMIC STABILITY: FOOD INSECURITY: WITHIN THE PAST 12 MONTHS, YOU WORRIED THAT YOUR FOOD WOULD RUN OUT BEFORE YOU GOT MONEY TO BUY MORE.: NEVER TRUE

## 2025-06-25 ASSESSMENT — LIFESTYLE VARIABLES
HOW OFTEN DO YOU HAVE A DRINK CONTAINING ALCOHOL: NEVER
SKIP TO QUESTIONS 9-10: 1
HOW OFTEN DO YOU HAVE SIX OR MORE DRINKS ON ONE OCCASION: NEVER
AUDIT-C TOTAL SCORE: 0
HOW MANY STANDARD DRINKS CONTAINING ALCOHOL DO YOU HAVE ON A TYPICAL DAY: PATIENT DOES NOT DRINK

## 2025-06-25 ASSESSMENT — ENCOUNTER SYMPTOMS
DEPRESSION: 0
OCCASIONAL FEELINGS OF UNSTEADINESS: 0
LOSS OF SENSATION IN FEET: 1

## 2025-06-25 ASSESSMENT — PAIN SCALES - GENERAL: PAINLEVEL_OUTOF10: 0-NO PAIN

## 2025-06-25 NOTE — ASSESSMENT & PLAN NOTE
Patient states that edema has decreased. She admits to snoring. Patient had a normal cardiac BNP and DVT study in the ER. Differential diagnosis includes diastolic dysfunction, HAILEE. Will check ECHO cardiogram, routine screening labs also

## 2025-06-25 NOTE — PROGRESS NOTES
"Chief Complaint/HPI:    ER follow up visit: LE edema - patient developed swelling of the LE. It initially was severe. Patient went to ER to make certain that she did not have a DVT.  Patient has no other issues now.    Patient slipped on steps last week. Patient struck her back and buttocks, bruising has decreased now. She did not strike her head     ROS otherwise negative aside from what was mentioned above in HPI.      Problem List[1]      Medical History[2]  Surgical History[3]  Social History     Social History Narrative    Not on file         ALLERGIES  Pineapple juice and Vancomycin      MEDICATIONS  Medications Ordered Prior to Encounter[4]      PHYSICAL EXAM  /82 (BP Location: Left arm, Patient Position: Sitting, BP Cuff Size: Large adult)   Pulse 66   Temp 36.3 °C (97.3 °F)   Resp 18   Ht 1.626 m (5' 4\")   Wt 150 kg (330 lb)   SpO2 96%   BMI 56.64 kg/m²   Body mass index is 56.64 kg/m².    Constitutional   General appearance:   well developed, well nourished, morbidly obese, pleasant  Eyes   Inspection of eyes: Sclera and conjunctiva were normal.   Ears, Nose, Mouth, and Throat   Ears: Auricles: Normal. No thyromegaly or neck masses noted  Pulmonary   Respiratory assessment: No respiratory distress, normal respiratory rhythm and effort.    Auscultation of Lungs: Clear bilateral breath sounds. no rales or crackles were heard bilaterally. no rhonchi. no friction rub. No wheezing is noted  Cardiovascular   Auscultation of heart: Apical pulse normal, heart rate and rhythm normal, rare premature beat is noted, normal S1 and S2, no murmurs and no pericardial rub.  No carotid bruits.Exam for edema: trace bilateral anterior ankle edema, L>R  Lymphatic   Palpation of lymph nodes in neck: No cervical lymphadenopathy.   Skin   No lesions on exposed skin is noted    Neurologic   Cranial nerves: Nerves 2-12 were intact, no focal neuro defects.   Psychiatric   Orientation: Oriented to person, place, and " time.    Mood and affect: Normal.     ASSESSMENT/PLAN  Problem List Items Addressed This Visit       Peripheral edema - Primary    Current Assessment & Plan   Patient states that edema has decreased. She admits to snoring. Patient had a normal cardiac BNP and DVT study in the ER. Differential diagnosis includes diastolic dysfunction, HAILEE. Will check ECHO cardiogram, routine screening labs also         Relevant Orders    Transthoracic Echo Complete    CBC and Auto Differential    Comprehensive Metabolic Panel    Snoring    Relevant Orders    Transthoracic Echo Complete    CBC and Auto Differential    Comprehensive Metabolic Panel     Other Visit Diagnoses         Thyroid disorder screening        Relevant Orders    CBC and Auto Differential    Comprehensive Metabolic Panel    TSH with reflex to Free T4 if abnormal      Encounter for screening for lipid disorder        Relevant Orders    CBC and Auto Differential    Comprehensive Metabolic Panel    Lipid Panel          Follow up after testing, consider getting a sleep study completed also    Bright Garcia MD          [1]   Patient Active Problem List  Diagnosis    Chronic GERD    Positive hepatitis C antibody test    Well woman exam    Abnormal uterine bleeding (AUB)    Abnormal glucose affecting pregnancy ([facility]-HCC)    Acute maxillary sinusitis    Acute otitis media, right    Acute viral syndrome    Anemia of pregnancy    Breakthrough bleeding on Nexplanon    Chronic fatigue disorder    CMV mononucleosis    Decreased fetal movement ([facility]HS-HCC)    Dehydration    Dermatitis    Elevated blood pressure affecting pregnancy, antepartum    Elevated blood pressure reading    Enlarged tonsils    Folate deficiency    Headache    Hyperhomocystinemia    Cellulitis of knee    Infection of right knee (Multi)    Irregular menses    Labial lesion    Low back pain    Nausea and vomiting during pregnancy    Normal spontaneous vaginal delivery ([facility]HS-HCC)    Obesity affecting  pregnancy in third trimester ([facility]HS-HCC)    Obesity during pregnancy ([facility]HS-HCC)    Palpitations    Plantar wart of right foot    Puberty bleeding    Recurrent cellulitis    Right ankle pain    Right knee pain    Snoring    SOB (shortness of breath)    Urinary frequency    Wheezing on auscultation    Vaginal discharge    UTI (urinary tract infection)    Heterophil-positive mononucleosis syndrome    Positive H. pylori test    Physical exam, annual    Acute cough    Peripheral edema   [2]   Past Medical History:  Diagnosis Date    Nausea with vomiting, unspecified 12/13/2019    Nausea and vomiting in adult   [3]   Past Surgical History:  Procedure Laterality Date    OTHER SURGICAL HISTORY  11/04/2019    Wart removal   [4]   Current Outpatient Medications on File Prior to Visit   Medication Sig Dispense Refill    etonogestrel-eluting contraceptive (Nexplanon) 68 mg implant implant Inject under the skin.      omeprazole (PriLOSEC) 20 mg DR capsule 1 capsule (20 mg) once every 24 hours.       No current facility-administered medications on file prior to visit.

## 2025-07-25 ENCOUNTER — HOSPITAL ENCOUNTER (OUTPATIENT)
Dept: CARDIOLOGY | Facility: HOSPITAL | Age: 30
Discharge: HOME | End: 2025-07-25
Payer: COMMERCIAL

## 2025-07-25 DIAGNOSIS — R60.0 PERIPHERAL EDEMA: ICD-10-CM

## 2025-07-25 DIAGNOSIS — R06.83 SNORING: ICD-10-CM

## 2025-07-25 LAB
AORTIC VALVE MEAN GRADIENT: 5 MMHG
AORTIC VALVE PEAK VELOCITY: 1.63 M/S
AV PEAK GRADIENT: 11 MMHG
AVA (PEAK VEL): 2.21 CM2
AVA (VTI): 2.51 CM2
EJECTION FRACTION APICAL 4 CHAMBER: 69.6
EJECTION FRACTION: 71 %
LEFT ATRIUM VOLUME AREA LENGTH INDEX BSA: 26.1 ML/M2
LEFT VENTRICLE INTERNAL DIMENSION DIASTOLE: 5.03 CM (ref 3.5–6)
LEFT VENTRICULAR OUTFLOW TRACT DIAMETER: 2.08 CM
LV EJECTION FRACTION BIPLANE: 71 %
MITRAL VALVE E/A RATIO: 1.69
MITRAL VALVE E/E' RATIO: 6.23
RIGHT VENTRICLE FREE WALL PEAK S': 15.15 CM/S
RIGHT VENTRICLE PEAK SYSTOLIC PRESSURE: 16 MMHG
TRICUSPID ANNULAR PLANE SYSTOLIC EXCURSION: 2.9 CM

## 2025-07-25 PROCEDURE — 93306 TTE W/DOPPLER COMPLETE: CPT

## 2025-07-25 PROCEDURE — 93306 TTE W/DOPPLER COMPLETE: CPT | Performed by: INTERNAL MEDICINE

## 2025-10-28 ENCOUNTER — APPOINTMENT (OUTPATIENT)
Dept: PRIMARY CARE | Facility: CLINIC | Age: 30
End: 2025-10-28
Payer: COMMERCIAL